# Patient Record
Sex: MALE | URBAN - METROPOLITAN AREA
[De-identification: names, ages, dates, MRNs, and addresses within clinical notes are randomized per-mention and may not be internally consistent; named-entity substitution may affect disease eponyms.]

---

## 2019-10-16 ENCOUNTER — OFFICE VISIT (OUTPATIENT)
Dept: ORTHOPEDICS | Facility: CLINIC | Age: 54
End: 2019-10-16
Payer: OTHER GOVERNMENT

## 2019-10-16 VITALS
WEIGHT: 170 LBS | SYSTOLIC BLOOD PRESSURE: 170 MMHG | HEART RATE: 66 BPM | BODY MASS INDEX: 25.18 KG/M2 | HEIGHT: 69 IN | DIASTOLIC BLOOD PRESSURE: 100 MMHG

## 2019-10-16 DIAGNOSIS — M48.02 FORAMINAL STENOSIS OF CERVICAL REGION: Primary | ICD-10-CM

## 2019-10-16 DIAGNOSIS — M47.22 CERVICAL SPONDYLOSIS WITH RADICULOPATHY: ICD-10-CM

## 2019-10-16 DIAGNOSIS — M50.30 OTHER CERVICAL DISC DEGENERATION, UNSPECIFIED CERVICAL REGION: ICD-10-CM

## 2019-10-16 DIAGNOSIS — M40.292 OTHER KYPHOSIS OF CERVICAL REGION: ICD-10-CM

## 2019-10-16 PROCEDURE — 99203 PR OFFICE/OUTPT VISIT, NEW, LEVL III, 30-44 MIN: ICD-10-PCS | Mod: 25,S$GLB,, | Performed by: ORTHOPAEDIC SURGERY

## 2019-10-16 PROCEDURE — 99203 OFFICE O/P NEW LOW 30 MIN: CPT | Mod: 25,S$GLB,, | Performed by: ORTHOPAEDIC SURGERY

## 2019-10-16 RX ORDER — LISINOPRIL 20 MG/1
TABLET ORAL
COMMUNITY
Start: 2019-09-15 | End: 2020-09-23

## 2019-10-16 RX ORDER — POTASSIUM CHLORIDE 20 MEQ/1
TABLET, EXTENDED RELEASE ORAL
COMMUNITY
Start: 2019-08-13 | End: 2020-09-23

## 2019-10-16 RX ORDER — CARVEDILOL 25 MG/1
TABLET ORAL
COMMUNITY
Start: 2019-10-11

## 2019-10-16 RX ORDER — ALBUTEROL SULFATE 90 UG/1
AEROSOL, METERED RESPIRATORY (INHALATION)
COMMUNITY
Start: 2019-09-25 | End: 2020-09-23

## 2019-10-16 RX ORDER — CLONIDINE HYDROCHLORIDE 0.1 MG/1
TABLET ORAL
COMMUNITY
Start: 2019-09-14 | End: 2020-09-23

## 2019-10-16 RX ORDER — FUROSEMIDE 40 MG/1
TABLET ORAL
COMMUNITY
Start: 2019-08-13 | End: 2020-09-23

## 2019-10-16 NOTE — PROGRESS NOTES
Subjective:       Patient ID: Darren Nash is a 54 y.o. male.    Chief Complaint: Pain of the Neck (Neck pain x 4 years that radiates down right shoulder, arm and hand with numbness, tingling and burning. MRI done. MRI done at [facility] but no disk)      History of Present Illness  4 year history of insidious onset of neck pain radiating down right arm with pain and numbness in the right arm that is worsened over time he recently had an MRI was referred by his primary care doctor his symptoms on a constant daily basis he has no bowel or bladder incontinence. He's never had any therapy or epidural steroid injections    Current Medications  Current Outpatient Medications   Medication Sig Dispense Refill    carvedilol (COREG) 25 MG tablet       lisinopril (PRINIVIL,ZESTRIL) 20 MG tablet       albuterol (PROVENTIL/VENTOLIN HFA) 90 mcg/actuation inhaler       cloNIDine (CATAPRES) 0.1 MG tablet       furosemide (LASIX) 40 MG tablet       potassium chloride SA (K-DUR,KLOR-CON) 20 MEQ tablet        No current facility-administered medications for this visit.        Allergies  Review of patient's allergies indicates:   Allergen Reactions    Penicillins Rash     Other reaction(s): Rash       Past Medical History  Past Medical History:   Diagnosis Date    Congenital heart disease     Hypertension        Surgical History  History reviewed. No pertinent surgical history.    Family History:   History reviewed. No pertinent family history.    Social History:   Social History     Socioeconomic History    Marital status:      Spouse name: Not on file    Number of children: Not on file    Years of education: Not on file    Highest education level: Not on file   Occupational History    Not on file   Social Needs    Financial resource strain: Not on file    Food insecurity:     Worry: Not on file     Inability: Not on file    Transportation needs:     Medical: Not on file     Non-medical: Not on file    Tobacco Use    Smoking status: Former Smoker    Smokeless tobacco: Former User   Substance and Sexual Activity    Alcohol use: Not on file    Drug use: Not on file    Sexual activity: Not on file   Lifestyle    Physical activity:     Days per week: Not on file     Minutes per session: Not on file    Stress: Not on file   Relationships    Social connections:     Talks on phone: Not on file     Gets together: Not on file     Attends [employer]ism service: Not on file     Active member of club or organization: Not on file     Attends meetings of clubs or organizations: Not on file     Relationship status: Not on file   Other Topics Concern    Not on file   Social History Narrative    Not on file       Hospitalization/Major Diagnostic Procedure:     Review of Systems     General/Constitutional:  Chills denies. Fatigue denies. Fever denies. Weight gain denies. Weight loss denies.    Respiratory:  Shortness of breath denies.    Cardiovascular:  Chest pain denies.    Gastrointestinal:  Constipation denies. Diarrhea denies. Nausea denies. Vomiting denies.     Hematology:  Easy bruising denies. Prolonged bleeding denies.     Genitourinary:  Frequent urination denies. Pain in lower back denies. Painful urination denies.     Musculoskeletal:  See HPI for details    Skin:  Rash denies.    Neurologic:  Dizziness denies. Gait abnormalities denies. Seizures denies. Tingling/Numbess denies.    Psychiatric:  Anxiety denies. Depressed mood denies.     Objective:   Vital Signs:   Vitals:    10/16/19 1314   BP: (!) 170/100   Pulse:         Physical Exam      General Examination:     Constitutional: The patient is alert and oriented to lace person and time. Mood is pleasant.     Head/Face: Normal facial features normal eyebrows    Eyes: Normal extraocular motion bilaterally    Lungs: Respirations are equal and unlabored    Gait is coordinated.    Cardiovascular: There are no swelling or varicosities present.    Lymphatic:  Negative for adenopathy    Skin: Normal    Neurological: Level of consciousness normal. Oriented to place person and time and situation    Psychiatric: Oriented to time place person and situation    Patient observed to walk with normal gait pattern cervical exam shows right trapezius paraspinous muscle tenderness. Range of motion moderately restricted Arcenio's maneuver weakly positive on the right motor exam overall normal in all major muscles both upper extremities reflexes are symmetrical no hyperreflexia negative Rahul test no hyperreflexia lower extremities negative clonus. Negative straight leg raising. Lumbar spine nontender. He has test negative.    XRAY Report/ Interpretation : Cervical MRI report dated 10/01/2019 from [facility] was personally reviewed. The images are not available. There is evidence of advanced multilevel degenerative disc disease and facet arthropathy. Symptoms are most pronounced at C5-6 C6-7 C3-4 and C4-5. Degenerative spondylolisthesis C7-T1. Severe spinal stenosis noted with bilateral severe neural foraminal stenosis C4-5 C5-6 and C6-7 levels. Some spinal cord flattening at C5-6  AP and lateral x-rays of the cervical spine were performed today. Indications neck pain. Findings: Moderate narrowing C4-5 disc space marked narrowing C5-6 C6-7  Flexion-extension cervical x-rays performed in the office today demonstrate moderate disc degeneration at the C5-6    Assessment:       1. Foraminal stenosis of cervical region    2. Cervical spondylosis with radiculopathy    3. Other cervical disc degeneration, unspecified cervical region        Plan:       Darren was seen today for pain.    Diagnoses and all orders for this visit:    Foraminal stenosis of cervical region  Comments:  C4-5 C5-6 C6-7    Cervical spondylosis with radiculopathy    Other cervical disc degeneration, unspecified cervical region         No follow-ups on file.    Treatment options were discussed regards to the  nature of the spinal condition conservative pain interventional and surgical options were discussed in detail and the probability of success of the separate treatment options was discussed in detail the patient expressed a clear understanding of the treatment options would regards to surgery the procedure risks benefits complications and outcomes were discussed.  No guarantees were given regards to surgical outcome.  The risk associated with the spinal condition and an worsening of the condition was discussed with the patient expressed a thorough understanding.  The patient was warned about the possible development of cauda equina syndrome and its symptoms which include but are not limited to bowel or bladder dysfunction sexual dysfunction increasing pain increasing extremity numbness or weakness and saddle anesthesia.  The patient was advised to either contact me immediately or to go to the nearest hospital emergency room if symptoms of cauda equina syndrome with to develop.  The patient expressed an understanding of these instructions.  The pros and cons of observation versus epidural steroid injection versus surgery were discussed including realistic expectations of surgery would require a 3 level discectomy and spinal fusion C4-C7. The patient that this under advisement contact me if he decides to have either an epidural steroid injection was coming back in for further discussion regards to cervical spine surgery      This note was created using Dragon voice recognition software that occasionally misinterpreted phrases or words.

## 2019-10-16 NOTE — PATIENT INSTRUCTIONS
[facility]  [facility]      EPIDURAL STEROID INJECTION    What is the epidural space?    The membrane that covers the spinal cord and nerve roots in your spine is called the dura membrane. The space surrounding the dura is the epidural space. Nerves travel through the epidural space to your neck, back, legs and arms. Inflammation of these nerve roots may cause pain in these regions due to irritation from a damaged disc or from contact in some way with the bony structure of the spine.     What is an epidural steroid injection and why is it helpful?    An epidural injection places anti-inflammatory medicine into the epidural space to decrease inflammation of the nerve roots, hopefully reducing your pain. The epidural injection may help the injury to heal by reducing inflammation. It may provide permanent relief or a period of pain relief for several months while the injury or cause of your pain is healing.    What will happen to me during the procedure?    An IV will be started so that sedation can be given. You will be positioned in such a way that your doctor can best visualize the area to be injected. The skin on your back or neck will be scrubbed using sterile scrub (soap). Next, the physician will numb a small area of skin where the epidural needle will be placed. This medicine stings for several seconds. After the numbing medicine is effective, your doctor will direct a small epidural needle using x-ray guidance into the epidural space. A small of amount of contrast (dye) is then injected to insure proper needle position in the epidural space. Then a mixture of numbing medicine (anesthetic) and anti-inflammatory (cortisone/steroid) will be injected.     What will happen after the procedure?    You will go back to the recovery area where you will be monitored for 30-60 minutes. You may experience some numbness and/or weakness into your legs and arms for a few hours. The steroids  will begin working 3-5 days after the procedure and may continue to improve your pain for up to fourteen days.     You should have a follow up visit 2 weeks after the procedure to determine if further treatment is needed. These injections are often performed in a series of three (3) to provide the best possible results.     General Pre/Post Procedure Instructions:    You should not eat or drink anything after 12 oclock the night before the procedure. If you are diabetic, do not take your insulin or oral medication the morning of the procedure because you have had nothing to eat. If you are taking routine heart or blood pressure medicine, you should take it with a sip of water the morning of the procedure. You should not take medications that give pain relief or lessen your usual pain. These medicines can be restarted after the procedure if they are needed.     If you are on Coumadin, Heparin, Plavix or other blood thinners (including aspirin and all medications that contain aspirin), you must notify the office well in advance so the timing of these medications can be coordinated with your primary care physician.     You will be at the hospital/surgery center for a few hours for your procedure. A  must accompany you and be responsible for driving you home. No driving is allowed the day of the procedure. You may return to your normal activities the day after the procedure, including returning to work.     If you are unable to keep this appointment, please give notice as soon as possible and at least 24 hours in advance during regular office hours.    Thank you.

## 2020-09-23 ENCOUNTER — TELEPHONE (OUTPATIENT)
Dept: RADIOLOGY | Facility: CLINIC | Age: 55
End: 2020-09-23

## 2020-09-23 ENCOUNTER — OFFICE VISIT (OUTPATIENT)
Dept: ORTHOPEDICS | Facility: CLINIC | Age: 55
End: 2020-09-23
Payer: OTHER GOVERNMENT

## 2020-09-23 VITALS
HEART RATE: 70 BPM | DIASTOLIC BLOOD PRESSURE: 95 MMHG | BODY MASS INDEX: 27.32 KG/M2 | SYSTOLIC BLOOD PRESSURE: 192 MMHG | WEIGHT: 185 LBS

## 2020-09-23 DIAGNOSIS — M50.30 DEGENERATIVE, INTERVERTEBRAL DISC, CERVICAL: ICD-10-CM

## 2020-09-23 DIAGNOSIS — M47.812 FACET ARTHRITIS OF CERVICAL REGION: ICD-10-CM

## 2020-09-23 DIAGNOSIS — M48.02 FORAMINAL STENOSIS OF CERVICAL REGION: Primary | ICD-10-CM

## 2020-09-23 DIAGNOSIS — M47.812 CERVICAL SPONDYLOSIS: ICD-10-CM

## 2020-09-23 PROCEDURE — 99213 OFFICE O/P EST LOW 20 MIN: CPT | Mod: S$GLB,,, | Performed by: ORTHOPAEDIC SURGERY

## 2020-09-23 PROCEDURE — 99213 PR OFFICE/OUTPT VISIT, EST, LEVL III, 20-29 MIN: ICD-10-PCS | Mod: S$GLB,,, | Performed by: ORTHOPAEDIC SURGERY

## 2020-09-23 RX ORDER — LISINOPRIL 40 MG/1
TABLET ORAL
COMMUNITY
Start: 2020-09-09

## 2020-09-23 NOTE — PROGRESS NOTES
Subjective:       Patient ID: Darren Nash is a 55 y.o. male.    Chief Complaint: Pain of the Neck (Denies neck pain but right arm to fingers are numb x 18 months. No injury MRI done at [place] 1-2019. He has disk. Patient advised to see primary for BP issues)      History of Present Illness  Patient was seen almost a year ago for same symptoms of neck pain radiating diet right arm to hand he had an MRI study done and we had the report but not the images we did discuss the possibility of 3 level cervical fusion versus epidural steroid injection but he never came back to discuss further surgery and never called have the epidural steroid injection patient feels the pains are intolerable regards his neck radiating down right onto the hand and does not want to have an epidural steroid injection he is interested in surgery    Current Medications  Current Outpatient Medications   Medication Sig Dispense Refill    carvedilol (COREG) 25 MG tablet       lisinopriL (PRINIVIL,ZESTRIL) 40 MG tablet        No current facility-administered medications for this visit.        Allergies  Review of patient's allergies indicates:   Allergen Reactions    Penicillins Rash     Other reaction(s): Rash       Past Medical History  Past Medical History:   Diagnosis Date    Congenital heart disease     Hypertension        Surgical History  History reviewed. No pertinent surgical history.    Family History:   History reviewed. No pertinent family history.    Social History:   Social History     Socioeconomic History    Marital status:      Spouse name: Not on file    Number of children: Not on file    Years of education: Not on file    Highest education level: Not on file   Occupational History    Not on file   Social Needs    Financial resource strain: Not on file    Food insecurity     Worry: Not on file     Inability: Not on file    Transportation needs     Medical: Not on file     Non-medical: Not on file   Tobacco  Use    Smoking status: Former Smoker    Smokeless tobacco: Former User   Substance and Sexual Activity    Alcohol use: Not on file    Drug use: Not on file    Sexual activity: Not on file   Lifestyle    Physical activity     Days per week: Not on file     Minutes per session: Not on file    Stress: Not on file   Relationships    Social connections     Talks on phone: Not on file     Gets together: Not on file     Attends Yarsanism service: Not on file     Active member of club or organization: Not on file     Attends meetings of clubs or organizations: Not on file     Relationship status: Not on file   Other Topics Concern    Not on file   Social History Narrative    Not on file       Hospitalization/Major Diagnostic Procedure:     Review of Systems     General/Constitutional:  Chills denies. Fatigue denies. Fever denies. Weight gain denies. Weight loss denies.    Respiratory:  Shortness of breath denies.    Cardiovascular:  Chest pain denies.    Gastrointestinal:  Constipation denies. Diarrhea denies. Nausea denies. Vomiting denies.     Hematology:  Easy bruising denies. Prolonged bleeding denies.     Genitourinary:  Frequent urination denies. Pain in lower back denies. Painful urination denies.     Musculoskeletal:  See HPI for details    Skin:  Rash denies.    Neurologic:  Dizziness denies. Gait abnormalities denies. Seizures denies. Tingling/Numbess denies.    Psychiatric:  Anxiety denies. Depressed mood denies.     Objective:   Vital Signs:   Vitals:    09/23/20 1315   BP: (!) 192/95   Pulse: 70        Physical Exam      General Examination:     Constitutional: The patient is alert and oriented to lace person and time. Mood is pleasant.     Head/Face: Normal facial features normal eyebrows    Eyes: Normal extraocular motion bilaterally    Lungs: Respirations are equal and unlabored    Gait is coordinated.    Cardiovascular: There are no swelling or varicosities present.    Lymphatic: Negative for  adenopathy    Skin: Normal    Neurological: Level of consciousness normal. Oriented to place person and time and situation    Psychiatric: Oriented to time place person and situation    Cervical paraspinous muscles tender spasm on the right side noted Spurling's maneuver positive on the right subjective numbness noted C6-C7 nerve root distribution grade 4 5 was performed October 2019 was reviewed patient claims has been no change in his symptoms since his MRI is shows evidence of multilevel degenerative disc disease and facet arthropathy there is evidence of bilateral neural foraminal stenosis at C4-5 C5-6 and C6-7 weakness of right wrist dorsiflexors and right biceps.  XRAY Report/ Interpretation:  AP and lateral x-rays of the cervical spine were performed today. Indications neck pain. Findings:  A disc space narrowing with anterior posterior osteophyte formation significant.  Solid mild disc space narrowing with osteophyte formation C4-5  Cervical MRI study that was performed October 2019 was reviewed the MRI is was performed a year ago however patient claims his symptoms are unchanged there is evidence of multilevel degenerative disc disease and multilevel foraminal stenosis most severe at C5-6 and C6-7 the MRI images were personally reviewed      Assessment:       1. Foraminal stenosis of cervical region    2. Degenerative, intervertebral disc, cervical    3. Cervical spondylosis    4. Facet arthritis of cervical region        Plan:       Darren was seen today for pain.    Diagnoses and all orders for this visit:    Foraminal stenosis of cervical region    Degenerative, intervertebral disc, cervical  -     X-Ray Cervical Spine AP And Lateral    Cervical spondylosis    Facet arthritis of cervical region         Follow up for schedule sx.    Treatment options were discussed regards to the nature of the spinal condition conservative pain interventional and surgical options were discussed in detail and the  probability of success of the separate treatment options was discussed in detail the patient expressed a clear understanding of the treatment options would regards to surgery the procedure risks benefits complications and outcomes were discussed.  No guarantees were given regards to surgical outcome.  The technical aspects of the surgery were discussed in detail with the patient today. The patient was able to verbalize an understanding. The procedure risk benefits alternatives and possible complications of the surgical procedure was discussed including expected outcomes. No guarantees were given regards to outcomes. Consent forms were will be signed at a later date. All questions regarding the surgery itself were answered. The patient wishes to proceed with surgery and will be scheduled. The patient may require preoperative medical clearance.    We planned cervical diskectomies and decompression C4-5 C5-6 and C6-7 with either interbody devices at these 3 levels and allograft bone graft for possible corpectomies of C5 and C6 anterior plate fixation C4-C7 is recommended the procedure risks benefits complications alternatives and realistic expectations surgery were discussed.  While I think we can restore lordosis better with interbody devices we will again review his diagnostic studies to decide whether to do multilevel fusions interbody space versus corpectomy    We will get medical clearance for him  After discussing treatment options with him he would consider having surgery but when told that he would have to have a COVID test preoperatively refused to have this done and at this point time will wants to cancel surgery    This note was created using Dragon voice recognition software that occasionally misinterpreted phrases or words.